# Patient Record
Sex: FEMALE | Race: BLACK OR AFRICAN AMERICAN | NOT HISPANIC OR LATINO | Employment: OTHER | URBAN - METROPOLITAN AREA
[De-identification: names, ages, dates, MRNs, and addresses within clinical notes are randomized per-mention and may not be internally consistent; named-entity substitution may affect disease eponyms.]

---

## 2020-09-30 ENCOUNTER — OFFICE VISIT (OUTPATIENT)
Dept: PODIATRY | Facility: CLINIC | Age: 40
End: 2020-09-30
Payer: OTHER GOVERNMENT

## 2020-09-30 VITALS
HEIGHT: 66 IN | BODY MASS INDEX: 32.95 KG/M2 | HEART RATE: 64 BPM | DIASTOLIC BLOOD PRESSURE: 76 MMHG | TEMPERATURE: 98 F | WEIGHT: 205 LBS | SYSTOLIC BLOOD PRESSURE: 114 MMHG

## 2020-09-30 DIAGNOSIS — M72.2 PLANTAR FASCIITIS: Primary | ICD-10-CM

## 2020-09-30 PROCEDURE — 99999 PR PBB SHADOW E&M-NEW PATIENT-LVL III: ICD-10-PCS | Mod: PBBFAC,,, | Performed by: PODIATRIST

## 2020-09-30 PROCEDURE — 99203 OFFICE O/P NEW LOW 30 MIN: CPT | Mod: S$PBB,,, | Performed by: PODIATRIST

## 2020-09-30 PROCEDURE — 99999 PR PBB SHADOW E&M-NEW PATIENT-LVL III: CPT | Mod: PBBFAC,,, | Performed by: PODIATRIST

## 2020-09-30 PROCEDURE — 99203 PR OFFICE/OUTPT VISIT, NEW, LEVL III, 30-44 MIN: ICD-10-PCS | Mod: S$PBB,,, | Performed by: PODIATRIST

## 2020-09-30 PROCEDURE — 99203 OFFICE O/P NEW LOW 30 MIN: CPT | Mod: PBBFAC | Performed by: PODIATRIST

## 2020-09-30 RX ORDER — TRAZODONE HYDROCHLORIDE 50 MG/1
50 TABLET ORAL
COMMUNITY

## 2020-09-30 RX ORDER — DICLOFENAC SODIUM 75 MG/1
75 TABLET, DELAYED RELEASE ORAL 2 TIMES DAILY
Qty: 60 TABLET | Refills: 1 | Status: SHIPPED | OUTPATIENT
Start: 2020-09-30 | End: 2020-10-30

## 2020-09-30 RX ORDER — BACLOFEN 10 MG/1
TABLET ORAL
COMMUNITY
Start: 2020-08-17

## 2020-09-30 RX ORDER — NABUMETONE 750 MG/1
TABLET, FILM COATED ORAL
COMMUNITY

## 2020-09-30 RX ORDER — SERTRALINE HYDROCHLORIDE 100 MG/1
150 TABLET, FILM COATED ORAL
COMMUNITY

## 2020-10-04 NOTE — PROGRESS NOTES
Subjective:       Patient ID: Mary Iniguez is a 40 y.o. female.    Chief Complaint: Foot Pain and Foot Problem   Presents today with a complaint of bilateral foot pain right greater than left patient states that she had an episode of heel pain in 2017 that was the 1st flare up she had she states it has been on and off since that time.  Patient states that she has custom-made inserts but these are not helping her right heel is definitely more painful than her left she has taken different anti-inflammatories including Robaxin Mobic these have helped her to a limited capacity.  Patient states she has also had an injection in her heel which helped her short-term.  Patient states she has been off of work for the past 2 and half weeks this is definitely helped to decrease her discomfort she did have a back procedure performed.  Patient's recent back procedure is the reason she has been off from work for the past 2 and half weeks.    Past Medical History:   Diagnosis Date    Depression     Heart murmur      Past Surgical History:   Procedure Laterality Date    BREAST BIOPSY      CYST REMOVAL      right wrist     MYOMECTOMY      WISDOM TOOTH EXTRACTION       Family History   Problem Relation Age of Onset    Heart attack Maternal Grandmother     Hypertension Maternal Grandmother     Stroke Maternal Grandfather     Hypertension Maternal Grandfather      Social History     Socioeconomic History    Marital status:      Spouse name: Not on file    Number of children: Not on file    Years of education: Not on file    Highest education level: Not on file   Occupational History    Not on file   Social Needs    Financial resource strain: Not on file    Food insecurity     Worry: Not on file     Inability: Not on file    Transportation needs     Medical: Not on file     Non-medical: Not on file   Tobacco Use    Smoking status: Never Smoker    Smokeless tobacco: Never Used   Substance and Sexual  "Activity    Alcohol use: Not Currently    Drug use: Never    Sexual activity: Yes   Lifestyle    Physical activity     Days per week: Not on file     Minutes per session: Not on file    Stress: Not on file   Relationships    Social connections     Talks on phone: Not on file     Gets together: Not on file     Attends [employer] service: Not on file     Active member of club or organization: Not on file     Attends meetings of clubs or organizations: Not on file     Relationship status: Not on file   Other Topics Concern    Not on file   Social History Narrative    Not on file       Current Outpatient Medications   Medication Sig Dispense Refill    cetirizine HCl (ZYRTEC ORAL)       sertraline (ZOLOFT) 100 MG tablet Take 150 mg by mouth.      baclofen (LIORESAL) 10 MG tablet TK 1 T PO  BID PRN      diclofenac (VOLTAREN) 75 MG EC tablet Take 1 tablet (75 mg total) by mouth 2 (two) times daily. 60 tablet 1    nabumetone (RELAFEN) 750 MG tablet       traZODone (DESYREL) 50 MG tablet Take 50 mg by mouth.       No current facility-administered medications for this visit.      Review of patient's allergies indicates:   Allergen Reactions    Iodine and iodide containing products Hives, Itching, Other (See Comments) and Rash    Shellfish containing products Hives, Itching, Other (See Comments) and Rash       Review of Systems   All other systems reviewed and are negative.      Objective:      Vitals:    09/30/20 1416   BP: 114/76   Pulse: 64   Temp: 97.6 °F (36.4 °C)   Weight: 93 kg (205 lb)   Height: 5' 6" (1.676 m)     Physical Exam  Vitals signs and nursing note reviewed.   Constitutional:       Appearance: Normal appearance.   Cardiovascular:      Pulses:           Dorsalis pedis pulses are 2+ on the right side and 2+ on the left side.        Posterior tibial pulses are 2+ on the right side and 2+ on the left side.   Pulmonary:      Effort: Pulmonary effort is normal.   Musculoskeletal: Normal range of " motion.         General: Tenderness present.        Feet:    Feet:      Right foot:      Protective Sensation: 2 sites tested. 2 sites sensed.      Skin integrity: Erythema and warmth present.      Left foot:      Protective Sensation: 2 sites tested. 2 sites sensed.      Skin integrity: Erythema and warmth present.   Skin:     General: Skin is warm.      Capillary Refill: Capillary refill takes less than 2 seconds.   Neurological:      General: No focal deficit present.      Mental Status: She is alert.   Psychiatric:         Mood and Affect: Mood normal.         Behavior: Behavior normal.         Thought Content: Thought content normal.         Judgment: Judgment normal.           Assessment:       1. Plantar fasciitis        Plan:       Presents today with a complaint of bilateral foot pain right greater than left patient states that she had an episode of heel pain in 2017 that was the 1st flare up she had she states it has been on and off since that time.  Patient states that she has custom-made inserts but these are not helping her right heel is definitely more painful than her left she has taken different anti-inflammatories including Robaxin Mobic these have helped her to a limited capacity.  Patient states she has also had an injection in her heel which helped her short-term.  Patient states she has been off of work for the past 2 and half weeks this is definitely helped to decrease her discomfort she did have a back procedure performed.  Patient's recent back procedure is the reason she has been off from work for the past 2 and half weeks.  On evaluation patient has findings consistent with plantar fasciitis bilateral there is definite tenderness at the plantar fascial insertion bilateral right greater than left.  Patient has custom-molded orthotics with her today these are poorly made they do not have any posting on them at all they do not appear to be supporting the patient as well as she should be  supported in her very flexible in nature.  I have advised the patient were going to try to work with the orthotics she currently has I added some temporary medial posting to the orthotics bilateral to rotate the arch to give the patient better support she is to wear these at all time she is not to walk barefoot at all I have also started the patient on diclofenac I am going to see her for follow-up in 2 weeks I want to see how she responds to this medial posting as a beginning point for treating and controlling her plantar fascial pain.  Patient was in understanding and agreement with everything discussed total face-to-face time equaled 30 min.This note was created using [email or web address] voice recognition software that occasionally misinterpreted phrases or words.

## 2020-10-14 ENCOUNTER — OFFICE VISIT (OUTPATIENT)
Dept: PODIATRY | Facility: CLINIC | Age: 40
End: 2020-10-14
Payer: OTHER GOVERNMENT

## 2020-10-14 VITALS — WEIGHT: 205 LBS | BODY MASS INDEX: 32.95 KG/M2 | HEIGHT: 66 IN

## 2020-10-14 DIAGNOSIS — M72.2 PLANTAR FASCIITIS: Primary | ICD-10-CM

## 2020-10-14 PROCEDURE — 99213 PR OFFICE/OUTPT VISIT, EST, LEVL III, 20-29 MIN: ICD-10-PCS | Mod: S$PBB,,, | Performed by: PODIATRIST

## 2020-10-14 PROCEDURE — 99213 OFFICE O/P EST LOW 20 MIN: CPT | Mod: PBBFAC | Performed by: PODIATRIST

## 2020-10-14 PROCEDURE — 99999 PR PBB SHADOW E&M-EST. PATIENT-LVL III: ICD-10-PCS | Mod: PBBFAC,,, | Performed by: PODIATRIST

## 2020-10-14 PROCEDURE — 99999 PR PBB SHADOW E&M-EST. PATIENT-LVL III: CPT | Mod: PBBFAC,,, | Performed by: PODIATRIST

## 2020-10-14 PROCEDURE — 99213 OFFICE O/P EST LOW 20 MIN: CPT | Mod: S$PBB,,, | Performed by: PODIATRIST

## 2020-10-17 NOTE — PROGRESS NOTES
Subjective:       Patient ID: Mary Iniguez is a 40 y.o. female.    Chief Complaint: Follow-up, Foot Pain, and Foot Problem   Presents today with a complaint of bilateral foot pain right greater than left patient states that she had an episode of heel pain in 2017 that was the 1st flare up she had she states it has been on and off since that time.  Patient states that she has custom-made inserts but these are not helping her right heel is definitely more painful than her left she has taken different anti-inflammatories including Robaxin Mobic these have helped her to a limited capacity.  Patient states she has also had an injection in her heel which helped her short-term.     Past Medical History:   Diagnosis Date    Depression     Heart murmur      Past Surgical History:   Procedure Laterality Date    BREAST BIOPSY      CYST REMOVAL      right wrist     MYOMECTOMY      WISDOM TOOTH EXTRACTION       Family History   Problem Relation Age of Onset    Heart attack Maternal Grandmother     Hypertension Maternal Grandmother     Stroke Maternal Grandfather     Hypertension Maternal Grandfather      Social History     Socioeconomic History    Marital status:      Spouse name: Not on file    Number of children: Not on file    Years of education: Not on file    Highest education level: Not on file   Occupational History    Not on file   Social Needs    Financial resource strain: Not on file    Food insecurity     Worry: Not on file     Inability: Not on file    Transportation needs     Medical: Not on file     Non-medical: Not on file   Tobacco Use    Smoking status: Never Smoker    Smokeless tobacco: Never Used   Substance and Sexual Activity    Alcohol use: Not Currently    Drug use: Never    Sexual activity: Yes   Lifestyle    Physical activity     Days per week: Not on file     Minutes per session: Not on file    Stress: Not on file   Relationships    Social connections     Talks on  "phone: Not on file     Gets together: Not on file     Attends Jew service: Not on file     Active member of club or organization: Not on file     Attends meetings of clubs or organizations: Not on file     Relationship status: Not on file   Other Topics Concern    Not on file   Social History Narrative    Not on file       Current Outpatient Medications   Medication Sig Dispense Refill    baclofen (LIORESAL) 10 MG tablet TK 1 T PO  BID PRN      cetirizine HCl (ZYRTEC ORAL)       diclofenac (VOLTAREN) 75 MG EC tablet Take 1 tablet (75 mg total) by mouth 2 (two) times daily. 60 tablet 1    nabumetone (RELAFEN) 750 MG tablet       sertraline (ZOLOFT) 100 MG tablet Take 150 mg by mouth.      traZODone (DESYREL) 50 MG tablet Take 50 mg by mouth.       No current facility-administered medications for this visit.      Review of patient's allergies indicates:   Allergen Reactions    Iodine and iodide containing products Hives, Itching, Other (See Comments) and Rash    Shellfish containing products Hives, Itching, Other (See Comments) and Rash       Review of Systems   All other systems reviewed and are negative.      Objective:      Vitals:    10/14/20 0940   Weight: 93 kg (205 lb)   Height: 5' 6" (1.676 m)     Physical Exam  Vitals signs and nursing note reviewed.   Constitutional:       Appearance: Normal appearance.   Cardiovascular:      Pulses:           Dorsalis pedis pulses are 2+ on the right side and 2+ on the left side.        Posterior tibial pulses are 2+ on the right side and 2+ on the left side.   Pulmonary:      Effort: Pulmonary effort is normal.   Musculoskeletal: Normal range of motion.         General: Tenderness present.        Feet:    Feet:      Right foot:      Protective Sensation: 2 sites tested. 2 sites sensed.      Skin integrity: Erythema and warmth present.      Left foot:      Protective Sensation: 2 sites tested. 2 sites sensed.      Skin integrity: Erythema and warmth present. "   Skin:     General: Skin is warm.      Capillary Refill: Capillary refill takes less than 2 seconds.   Neurological:      General: No focal deficit present.      Mental Status: She is alert.   Psychiatric:         Mood and Affect: Mood normal.         Behavior: Behavior normal.         Thought Content: Thought content normal.         Judgment: Judgment normal.           Assessment:       1. Plantar fasciitis        Plan:       Patient presents today follow-up bilateral heel pain left greater than right.  Patient states that she is doing okay she is not really any worse she states she wore the arch supports with the posting that I applied to them however she states it really does not seem to have helped her problem she states her arches feel very stiff in are sore she went back to work yesterday after being off on medical leave and she is concerned that her condition is going to get worse.  Patient is tolerating the diclofenac as prescribed.  I removed the posting that I had put on the medial aspect of the patient's previously fabricated custom-molded orthotics this has not really helped the patient significantly I have advised her that she does need new orthotics custom-made the 1 that she had made recently are not supporting the base patient very well they are very flexible we are going to put in a request for authorization for new custom-molded orthotics we can make these out a carbon fiber so there is stiffer and give the patient better support in the meantime I added a softer heel cushion to the patient's orthotics that she currently has I have also added additional arch support to the plantar arch I want see how the patient does with this over the next several weeks she is going to continue the diclofenac as directed will be awaiting approval of the patient has custom-molded orthotics so we can make these for her I do feel the patient would greatly benefit from this.  Face-to-face time equaled 20 min.  This  note was created using [employer] voice recognition software that occasionally misinterpreted phrases or words.

## 2020-10-26 ENCOUNTER — OFFICE VISIT (OUTPATIENT)
Dept: PODIATRY | Facility: CLINIC | Age: 40
End: 2020-10-26
Payer: OTHER GOVERNMENT

## 2020-10-26 VITALS
OXYGEN SATURATION: 99 % | RESPIRATION RATE: 19 BRPM | WEIGHT: 205 LBS | DIASTOLIC BLOOD PRESSURE: 69 MMHG | BODY MASS INDEX: 32.95 KG/M2 | TEMPERATURE: 98 F | SYSTOLIC BLOOD PRESSURE: 114 MMHG | HEIGHT: 66 IN | HEART RATE: 69 BPM

## 2020-10-26 DIAGNOSIS — M72.2 PLANTAR FASCIITIS: Primary | ICD-10-CM

## 2020-10-26 PROCEDURE — 99999 PR PBB SHADOW E&M-EST. PATIENT-LVL III: CPT | Mod: PBBFAC,,, | Performed by: PODIATRIST

## 2020-10-26 PROCEDURE — 99215 OFFICE O/P EST HI 40 MIN: CPT | Mod: S$PBB,,, | Performed by: PODIATRIST

## 2020-10-26 PROCEDURE — 99213 OFFICE O/P EST LOW 20 MIN: CPT | Mod: PBBFAC | Performed by: PODIATRIST

## 2020-10-26 PROCEDURE — 99215 PR OFFICE/OUTPT VISIT, EST, LEVL V, 40-54 MIN: ICD-10-PCS | Mod: S$PBB,,, | Performed by: PODIATRIST

## 2020-10-26 PROCEDURE — 99999 PR PBB SHADOW E&M-EST. PATIENT-LVL III: ICD-10-PCS | Mod: PBBFAC,,, | Performed by: PODIATRIST

## 2020-10-28 NOTE — PROGRESS NOTES
Subjective:       Patient ID: Mary Iniguez is a 40 y.o. female.    Chief Complaint: Follow-up   Presents today with a complaint of bilateral foot pain right greater than left patient states that she had an episode of heel pain in 2017 that was the 1st flare up she had she states it has been on and off since that time.  Patient states that she has custom-made inserts but these are not helping her right heel is definitely more painful than her left she has taken different anti-inflammatories including Robaxin Mobic these have helped her to a limited capacity.  Patient states she has also had an injection in her heel which helped her short-term.     Past Medical History:   Diagnosis Date    Depression     Heart murmur      Past Surgical History:   Procedure Laterality Date    BREAST BIOPSY      CYST REMOVAL      right wrist     MYOMECTOMY      WISDOM TOOTH EXTRACTION       Family History   Problem Relation Age of Onset    Heart attack Maternal Grandmother     Hypertension Maternal Grandmother     Stroke Maternal Grandfather     Hypertension Maternal Grandfather      Social History     Socioeconomic History    Marital status:      Spouse name: Not on file    Number of children: Not on file    Years of education: Not on file    Highest education level: Not on file   Occupational History    Not on file   Social Needs    Financial resource strain: Not on file    Food insecurity     Worry: Not on file     Inability: Not on file    Transportation needs     Medical: Not on file     Non-medical: Not on file   Tobacco Use    Smoking status: Never Smoker    Smokeless tobacco: Never Used   Substance and Sexual Activity    Alcohol use: Not Currently    Drug use: Never    Sexual activity: Yes   Lifestyle    Physical activity     Days per week: Not on file     Minutes per session: Not on file    Stress: Not on file   Relationships    Social connections     Talks on phone: Not on file     Gets  "together: Not on file     Attends Confucianism service: Not on file     Active member of club or organization: Not on file     Attends meetings of clubs or organizations: Not on file     Relationship status: Not on file   Other Topics Concern    Not on file   Social History Narrative    Not on file       Current Outpatient Medications   Medication Sig Dispense Refill    baclofen (LIORESAL) 10 MG tablet TK 1 T PO  BID PRN      cetirizine HCl (ZYRTEC ORAL)       diclofenac (VOLTAREN) 75 MG EC tablet Take 1 tablet (75 mg total) by mouth 2 (two) times daily. 60 tablet 1    nabumetone (RELAFEN) 750 MG tablet       sertraline (ZOLOFT) 100 MG tablet Take 150 mg by mouth.      traZODone (DESYREL) 50 MG tablet Take 50 mg by mouth.       No current facility-administered medications for this visit.      Review of patient's allergies indicates:   Allergen Reactions    Iodine and iodide containing products Hives, Itching, Other (See Comments) and Rash    Shellfish containing products Hives, Itching, Other (See Comments) and Rash       Review of Systems   All other systems reviewed and are negative.      Objective:      Vitals:    10/26/20 1015   BP: 114/69   Pulse: 69   Resp: 19   Temp: 98.2 °F (36.8 °C)   TempSrc: Temporal   SpO2: 99%   Weight: 93 kg (205 lb)   Height: 5' 6" (1.676 m)     Physical Exam  Vitals signs and nursing note reviewed.   Constitutional:       Appearance: Normal appearance.   Cardiovascular:      Pulses:           Dorsalis pedis pulses are 2+ on the right side and 2+ on the left side.        Posterior tibial pulses are 2+ on the right side and 2+ on the left side.   Pulmonary:      Effort: Pulmonary effort is normal.   Musculoskeletal: Normal range of motion.         General: Tenderness present.        Feet:    Feet:      Right foot:      Protective Sensation: 2 sites tested. 2 sites sensed.      Skin integrity: Erythema and warmth present.      Left foot:      Protective Sensation: 2 sites tested. " 2 sites sensed.      Skin integrity: Erythema and warmth present.   Skin:     General: Skin is warm.      Capillary Refill: Capillary refill takes less than 2 seconds.   Neurological:      General: No focal deficit present.      Mental Status: She is alert.   Psychiatric:         Mood and Affect: Mood normal.         Behavior: Behavior normal.         Thought Content: Thought content normal.         Judgment: Judgment normal.           Assessment:       1. Plantar fasciitis        Plan:       Patient presents today follow-up bilateral heel pain left greater than right.  Patient presents today follow-up bilateral heel pain she states her feet are doing okay they have gotten better but they are not pain-free at this time she states she still has significant heel pain on her 1st steps out of bed in the morning or after she has been sitting for a short period of time.  Patient did tolerate the additional arch support that I applied to her old orthotics that really are not supporting the patient very well.  Patient has been taking the diclofenac as directed she is tolerating this I did advised the patient it is clear that she needs even more support than what she is currently getting with the most recent custom-molded orthotics that were fabricated for her I did add additional blue arch to the dorsal aspect of her custom-molded orthotics she already has additional arch applied to the plantar aspect I have advised her this is much more aggressive that is going to take some time for her to get used to this but I really believe that she needs this additional support additionally the patient has a metatarsal pad built into her custom-molded orthotics she states this really is not comfortable and I am not sure why she needed the metatarsal pad when she was being treated for plantar fasciitis.  Patient was cast today for new custom-molded orthotics I am going to add additional support in the arch to these with some medial  posting I have also ordered extra heel cushioning bilateral were going to hold these molds that we did today and send them to the lab to be fabricated once we have approval from the patient's insurance carrier.  In the meantime patient will continue to wear her old custom order molded orthotics with the additions that I have made to them today we will contact her when these new orthotics are fabricated ready to be dispensed to her she is going to continue with her old orthotics and continue to take the diclofenac as directed.  Total face-to-face time including discussion evaluation treatment casting for custom-molded orthotics in adjustments to current orthotics equaled 45 min.  This note was created using [employer] voice recognition software that occasionally misinterpreted phrases or words.

## 2020-11-02 ENCOUNTER — TELEPHONE (OUTPATIENT)
Dept: PODIATRY | Facility: CLINIC | Age: 40
End: 2020-11-02

## 2020-11-17 ENCOUNTER — TELEPHONE (OUTPATIENT)
Dept: PODIATRY | Facility: CLINIC | Age: 40
End: 2020-11-17

## 2020-11-17 NOTE — TELEPHONE ENCOUNTER
----- Message from Rocío Serrano sent at 11/17/2020 12:09 PM CST -----  Regarding: Advice  Contact: patient  Type: Needs Medical Advice    Who Called:  patient  Symptoms (please be specific):  n/a  How long has patient had these symptoms:  n/a  Pharmacy name and phone #:  n/a  Best Call Back Number: [phone] (home)     Additional Information: want to confirm that referral was received so that she can schedule

## 2021-01-04 ENCOUNTER — OFFICE VISIT (OUTPATIENT)
Dept: PODIATRY | Facility: CLINIC | Age: 41
End: 2021-01-04
Payer: OTHER GOVERNMENT

## 2021-01-04 VITALS
WEIGHT: 215 LBS | HEART RATE: 65 BPM | HEIGHT: 66 IN | DIASTOLIC BLOOD PRESSURE: 78 MMHG | BODY MASS INDEX: 34.55 KG/M2 | TEMPERATURE: 98 F | SYSTOLIC BLOOD PRESSURE: 113 MMHG

## 2021-01-04 DIAGNOSIS — M72.2 PLANTAR FASCIITIS: Primary | ICD-10-CM

## 2021-01-04 PROCEDURE — 99999 PR PBB SHADOW E&M-EST. PATIENT-LVL III: ICD-10-PCS | Mod: PBBFAC,,, | Performed by: PODIATRIST

## 2021-01-04 PROCEDURE — 99213 OFFICE O/P EST LOW 20 MIN: CPT | Mod: PBBFAC | Performed by: PODIATRIST

## 2021-01-04 PROCEDURE — 99999 PR PBB SHADOW E&M-EST. PATIENT-LVL III: CPT | Mod: PBBFAC,,, | Performed by: PODIATRIST

## 2021-01-04 PROCEDURE — 99213 OFFICE O/P EST LOW 20 MIN: CPT | Mod: S$PBB,,, | Performed by: PODIATRIST

## 2021-01-04 PROCEDURE — 99213 PR OFFICE/OUTPT VISIT, EST, LEVL III, 20-29 MIN: ICD-10-PCS | Mod: S$PBB,,, | Performed by: PODIATRIST
